# Patient Record
Sex: MALE | Race: OTHER
[De-identification: names, ages, dates, MRNs, and addresses within clinical notes are randomized per-mention and may not be internally consistent; named-entity substitution may affect disease eponyms.]

---

## 2019-11-17 ENCOUNTER — HOSPITAL ENCOUNTER (EMERGENCY)
Dept: HOSPITAL 54 - ER | Age: 19
Discharge: HOME | End: 2019-11-17
Payer: SELF-PAY

## 2019-11-17 VITALS — DIASTOLIC BLOOD PRESSURE: 84 MMHG | SYSTOLIC BLOOD PRESSURE: 129 MMHG

## 2019-11-17 VITALS — HEIGHT: 66 IN | WEIGHT: 160 LBS | BODY MASS INDEX: 25.71 KG/M2

## 2019-11-17 DIAGNOSIS — F17.200: ICD-10-CM
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
## 2019-11-17 DIAGNOSIS — F10.129: Primary | ICD-10-CM

## 2019-11-17 LAB
BASOPHILS # BLD AUTO: 0 /CMM (ref 0–0.2)
BASOPHILS NFR BLD AUTO: 0.5 % (ref 0–2)
EOSINOPHIL NFR BLD AUTO: 1.5 % (ref 0–6)
HCT VFR BLD AUTO: 43 % (ref 39–51)
HGB BLD-MCNC: 14.6 G/DL (ref 13.5–17.5)
LYMPHOCYTES NFR BLD AUTO: 1.8 /CMM (ref 0.8–4.8)
LYMPHOCYTES NFR BLD AUTO: 27.7 % (ref 20–44)
MCHC RBC AUTO-ENTMCNC: 34 G/DL (ref 31–36)
MCV RBC AUTO: 93 FL (ref 80–96)
MONOCYTES NFR BLD AUTO: 0.7 /CMM (ref 0.1–1.3)
MONOCYTES NFR BLD AUTO: 10.4 % (ref 2–12)
NEUTROPHILS # BLD AUTO: 3.8 /CMM (ref 1.8–8.9)
NEUTROPHILS NFR BLD AUTO: 59.9 % (ref 43–81)
PLATELET # BLD AUTO: 261 /CMM (ref 150–450)
RBC # BLD AUTO: 4.61 MIL/UL (ref 4.5–6)
WBC NRBC COR # BLD AUTO: 6.4 K/UL (ref 4.3–11)

## 2019-11-17 NOTE — NUR
PT THU FROM STREET FOR ALCOHOL INTOXICATION. PT ARRIVED TO ER WITH STRONG 
SMELL OF ALCOHOL. AAOX4. RESPIRATIONS EVEN AND UNLABORED. NO ACUTE DISTRESS 
NOTED AT THIS TIME. WILL CONTINUE TO MONITOR